# Patient Record
Sex: FEMALE | Race: BLACK OR AFRICAN AMERICAN | NOT HISPANIC OR LATINO | URBAN - METROPOLITAN AREA
[De-identification: names, ages, dates, MRNs, and addresses within clinical notes are randomized per-mention and may not be internally consistent; named-entity substitution may affect disease eponyms.]

---

## 2019-02-23 ENCOUNTER — EMERGENCY (EMERGENCY)
Facility: HOSPITAL | Age: 44
LOS: 0 days | Discharge: HOME | End: 2019-02-23
Payer: SELF-PAY

## 2019-02-23 VITALS
DIASTOLIC BLOOD PRESSURE: 87 MMHG | OXYGEN SATURATION: 100 % | HEIGHT: 65 IN | WEIGHT: 171.74 LBS | HEART RATE: 72 BPM | RESPIRATION RATE: 17 BRPM | TEMPERATURE: 99 F | SYSTOLIC BLOOD PRESSURE: 117 MMHG

## 2019-02-23 VITALS — HEART RATE: 78 BPM | SYSTOLIC BLOOD PRESSURE: 126 MMHG | TEMPERATURE: 98 F | DIASTOLIC BLOOD PRESSURE: 78 MMHG
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
## 2019-02-23 DIAGNOSIS — S01.81XA LACERATION WITHOUT FOREIGN BODY OF OTHER PART OF HEAD, INITIAL ENCOUNTER: ICD-10-CM
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
## 2019-02-23 DIAGNOSIS — H57.11 OCULAR PAIN, RIGHT EYE: ICD-10-CM

## 2019-02-23 PROCEDURE — 99283 EMERGENCY DEPT VISIT LOW MDM: CPT | Mod: 25

## 2019-02-23 PROCEDURE — 12011 RPR F/E/E/N/L/M 2.5 CM/<: CPT

## 2019-02-23 RX ORDER — TETANUS TOXOID, REDUCED DIPHTHERIA TOXOID AND ACELLULAR PERTUSSIS VACCINE, ADSORBED 5; 2.5; 8; 8; 2.5 [IU]/.5ML; [IU]/.5ML; UG/.5ML; UG/.5ML; UG/.5ML
0.5 SUSPENSION INTRAMUSCULAR ONCE
Qty: 0 | Refills: 0 | Status: COMPLETED | OUTPATIENT
Start: 2019-02-23 | End: 2019-02-23

## 2019-02-23 RX ADMIN — TETANUS TOXOID, REDUCED DIPHTHERIA TOXOID AND ACELLULAR PERTUSSIS VACCINE, ADSORBED 0.5 MILLILITER(S): 5; 2.5; 8; 8; 2.5 SUSPENSION INTRAMUSCULAR at 13:11

## 2019-02-23 NOTE — ED ADULT NURSE NOTE - NSIMPLEMENTINTERV_GEN_ALL_ED
Implemented All Universal Safety Interventions:  [place] to call system. Call bell, personal items and telephone within reach. Instruct patient to call for assistance. Room bathroom lighting operational. Non-slip footwear when patient is off stretcher. Physically safe environment: no spills, clutter or unnecessary equipment. Stretcher in lowest position, wheels locked, appropriate side rails in place.

## 2019-02-23 NOTE — ED ADULT TRIAGE NOTE - CHIEF COMPLAINT QUOTE
pt states " I was reaching for a can off the grocery shelf and the can hit me on the right side above my right eyebrow."

## 2019-02-23 NOTE — ED PROVIDER NOTE - OBJECTIVE STATEMENT
this is a 42yo F c/o of right facial laceration s/p a can falling on face while reaching for it in the supermarket in 1 day. Denies loc, neck pain, blurry vision, sob, this is a 44yo F c/o of right facial laceration s/p a can falling on face while reaching for it in the supermarket in 1 day. Denies loc, neck pain, blurry vision, sob, headache, neuro deficit

## 2019-02-28 ENCOUNTER — EMERGENCY (EMERGENCY)
Facility: HOSPITAL | Age: 44
LOS: 1 days | Discharge: HOME | End: 2019-02-28
Attending: EMERGENCY MEDICINE
Payer: SELF-PAY

## 2019-02-28 VITALS
HEIGHT: 65 IN | RESPIRATION RATE: 16 BRPM | DIASTOLIC BLOOD PRESSURE: 82 MMHG | WEIGHT: 169.98 LBS | TEMPERATURE: 97 F | SYSTOLIC BLOOD PRESSURE: 125 MMHG | HEART RATE: 82 BPM | OXYGEN SATURATION: 99 %

## 2019-02-28 LAB — HIV 1 & 2 AB SERPL IA.RAPID: SIGNIFICANT CHANGE UP

## 2019-02-28 PROCEDURE — 86703 HIV-1/HIV-2 1 RESULT ANTBDY: CPT

## 2019-02-28 PROCEDURE — G0463: CPT

## 2019-02-28 PROCEDURE — 36415 COLL VENOUS BLD VENIPUNCTURE: CPT

## 2019-02-28 NOTE — ED ADULT NURSE NOTE - NSIMPLEMENTINTERV_GEN_ALL_ED
Implemented All Universal Safety Interventions:  [place] to call system. Call bell, personal items and telephone within reach. Instruct patient to call for assistance. Room bathroom lighting operational. Non-slip footwear when patient is off stretcher. Physically safe environment: no spills, clutter or unnecessary equipment. Stretcher in lowest position, wheels locked, appropriate side rails in place.

## 2019-02-28 NOTE — ED PROVIDER NOTE - CLINICAL SUMMARY MEDICAL DECISION MAKING FREE TEXT BOX
hiv test, sutures removed. discussed anticipatory guidance and return precautions  pt cannot wait for hiv test result. asks for phone call with result.

## 2019-02-28 NOTE — ED PROVIDER NOTE - OBJECTIVE STATEMENT
44 y/o F with no significant PMHx and no significant PSHx presents to the ED with c/o suture removal x today. Pt states she had a laceration to forehead that was repaired x 4 days ago and is to have the sutures removed x today. Pt notes the laceration has healed well. Pt denies active bleeding or any other complaints. NKDA. 42 y/o F with no significant PMHx and no significant PSHx presents to the ED with c/o suture removal x today. Pt states she had a laceration to forehead that was repaired x 4 days ago and is to have the sutures removed x today. Pt notes the laceration has healed well. Pt denies active bleeding or any other complaints. NKDA. Pt would like to have an hiv test.

## 2019-03-11 NOTE — ED PROVIDER NOTE - CROS ED SKIN ALL NEG
MD ordered risperidone 0.5 mg PO four times a day, neglected to DC previous order for risperidone 0.5 mg PO three times a day. Contacted MD and KI'd previous order. - - -

## 2022-04-28 NOTE — ED PROVIDER NOTE - NS ED SCRIBE STATEMENT
Cardiac Rehab Outpatient Medical Nutrition Therapy    04/28/2022  9:00-9:40    Client History:    Pertinent medications:   Current Outpatient Medications   Medication Instructions    aspirin 81 mg, Oral, DAILY    atorvastatin (LIPITOR) 80 mg, Oral, NIGHTLY    clopidogrel (PLAVIX) 75 mg, Oral, DAILY    Multiple Vitamin (MULTIVITAMIN ADULT PO) 1 tablet, Oral, DAILY    nitroGLYCERIN (NITROSTAT) 0.4 mg, SubLINGual, EVERY 5 MIN PRN    tamsulosin (FLOMAX) 0.4 MG capsule TAKE ONE CAPSULE BY MOUTH 1/2 HOUR FOLLOWING THE SAME MEAL EACH DAY      Social hx: Lives with significant other. Currently retired. Involved with grocery shopping and food prep at home. May have a beer few times per week. Pertinent Medical hx: NSTEMI    Activity habits: Has always stayed active, exercise history. Enjoys hiking, golf, [employer]. Currently attends exercise sessions for cardiac rehab 3 days per week. Food/nutrition habits: Has been modifing eating habits for past year. Much more aware of sodium intake since starting cardiac rehab program. Enjoys eating fish and vegetables. Uses meal delivery, [employer], for 3 meals each week. Will reduce added salt in meals. Drinks mainly water. Has been reducing intake of sports drink due to sodium content.      Biochemical data: lipids 01/18/2022  T chol 180, HDL 52, , Tg 87     HbA1c 5.1%     Anthropometrics:    Ht: 66\"  Wt: 166#   IBW: 142-156#  % of IBW: 106            BMI: 26.79 , overweight classification but weight appears reasonable     Weight hx: stable     Diet hx: working on making better choices for past year    Estimated needs: 0078-9399 calories/day (25-30 mauri/kg current weight)     Nutrition dx: nutrition related knowledge deficit related to limited exposure to low sodium meal plan prior to rehab program as evidenced by patient report    Nutrition Prescription: low sodium meal plan 1500 mg/day, fruit/vegetable servings 4-5 per day    Nutrition Interventions: Discussion regarding current food choices and habits. Since involved in rehab program has been paying more attention to sodium content of foods. Does not add salt to meals and preparing meals with less sodium. Motivated to continue current eating habits with lean protein and vegetables. Admits gradually making changes to adopt [employer] meal plan.      Nutrition related goals: check food labels for sodium content with plan for 1500 mg/day, continue to include fruit and/or vegetables at all meals     Adherence/barriers to goals: no barriers at this time     Primary learner: attended alone   Education materials provided: [employer] shopping list   Method of education:   Explanation     Handouts   Teach back     Response to education:    Verbalized understanding            Rec/plan: Patient to continue in cardiac rehab program Attending

## 2022-10-27 NOTE — ED PROCEDURE NOTE - SKIN SUTURE
interrupted Colchicine Counseling:  Patient counseled regarding adverse effects including but not limited to stomach upset (nausea, vomiting, stomach pain, or diarrhea).  Patient instructed to limit alcohol consumption while taking this medication.  Colchicine may reduce blood counts especially with prolonged use.  The patient understands that monitoring of kidney function and blood counts may be required, especially at baseline. The patient verbalized understanding of the proper use and possible adverse effects of colchicine.  All of the patient's questions and concerns were addressed.